# Patient Record
Sex: FEMALE | Race: OTHER | URBAN - METROPOLITAN AREA
[De-identification: names, ages, dates, MRNs, and addresses within clinical notes are randomized per-mention and may not be internally consistent; named-entity substitution may affect disease eponyms.]

---

## 2022-02-01 ENCOUNTER — NEW PATIENT (OUTPATIENT)
Dept: URBAN - METROPOLITAN AREA CLINIC 49 | Facility: CLINIC | Age: 72
End: 2022-02-01

## 2022-02-01 DIAGNOSIS — H18.413: ICD-10-CM

## 2022-02-01 DIAGNOSIS — H25.13: ICD-10-CM

## 2022-02-01 PROCEDURE — 92004 COMPRE OPH EXAM NEW PT 1/>: CPT

## 2022-02-01 PROCEDURE — 92015 DETERMINE REFRACTIVE STATE: CPT

## 2022-02-01 ASSESSMENT — VISUAL ACUITY
OD_CC: 20/40-1
OS_CC: 20/20-2
OD_GLARE: 20/60
OU_CC: J1+
OS_GLARE: 20/400
OS_GLARE: >20/400
OD_PH: 20/25
OD_GLARE: 20/50

## 2022-02-01 ASSESSMENT — TONOMETRY
OS_IOP_MMHG: 16
OD_IOP_MMHG: 16

## 2022-02-01 NOTE — PATIENT DISCUSSION
On exam today right is worse than left. Patient to continue with MRX with prism until next visit when re evaluated for cataract surgery.

## 2023-06-20 ENCOUNTER — ESTABLISHED PATIENT (OUTPATIENT)
Dept: URBAN - METROPOLITAN AREA CLINIC 49 | Facility: CLINIC | Age: 73
End: 2023-06-20

## 2023-06-20 DIAGNOSIS — H43.392: ICD-10-CM

## 2023-06-20 DIAGNOSIS — H04.123: ICD-10-CM

## 2023-06-20 DIAGNOSIS — H35.363: ICD-10-CM

## 2023-06-20 PROCEDURE — 92134 CPTRZ OPH DX IMG PST SGM RTA: CPT

## 2023-06-20 PROCEDURE — 92014 COMPRE OPH EXAM EST PT 1/>: CPT

## 2023-06-20 ASSESSMENT — VISUAL ACUITY
OD_CC: 20/40-2
OD_PH: 20/30
OS_PH: 20/25
OS_CC: 20/30-2
OS_GLARE: 20/70
OS_GLARE: 20/50

## 2023-06-20 ASSESSMENT — TONOMETRY
OS_IOP_MMHG: 18
OD_IOP_MMHG: 19

## 2024-02-06 ENCOUNTER — CONSULTATION/EVALUATION (OUTPATIENT)
Dept: URBAN - METROPOLITAN AREA CLINIC 49 | Facility: LOCATION | Age: 74
End: 2024-02-06

## 2024-02-06 DIAGNOSIS — H35.362: ICD-10-CM

## 2024-02-06 DIAGNOSIS — H25.13: ICD-10-CM

## 2024-02-06 PROCEDURE — 92015 DETERMINE REFRACTIVE STATE: CPT

## 2024-02-06 PROCEDURE — 92134 CPTRZ OPH DX IMG PST SGM RTA: CPT

## 2024-02-06 PROCEDURE — 99213 OFFICE O/P EST LOW 20 MIN: CPT

## 2024-02-06 RX ORDER — LIFITEGRAST 50 MG/ML
1 SOLUTION/ DROPS OPHTHALMIC TWICE A DAY
Start: 2024-02-06

## 2024-02-06 ASSESSMENT — KERATOMETRY
OD_K1POWER_DIOPTERS: 42.25
OD_K2POWER_DIOPTERS: 42.50
OD_AXISANGLE2_DEGREES: 48
OS_K2POWER_DIOPTERS: 43.75
OS_AXISANGLE2_DEGREES: 92
OS_AXISANGLE_DEGREES: 002
OD_AXISANGLE_DEGREES: 138
OS_K1POWER_DIOPTERS: 42.25

## 2024-02-06 ASSESSMENT — VISUAL ACUITY
OD_GLARE: 20/70
OD_CC: 20/70-1
OS_GLARE: 20/40
OS_PH: 20/40+2
OS_CC: 20/70-1
OD_PH: 20/40+1
OS_GLARE: 20/60
OD_GLARE: 20/70

## 2024-02-06 ASSESSMENT — TONOMETRY
OD_IOP_MMHG: 17
OS_IOP_MMHG: 16

## 2024-02-21 ENCOUNTER — PRE-OP/H&P (OUTPATIENT)
Dept: URBAN - METROPOLITAN AREA CLINIC 53 | Facility: CLINIC | Age: 74
End: 2024-02-21

## 2024-02-21 DIAGNOSIS — H25.13: ICD-10-CM

## 2024-02-21 PROCEDURE — 92136 OPHTHALMIC BIOMETRY: CPT

## 2024-02-21 PROCEDURE — PREOP PRE OP VISIT

## 2024-02-21 PROCEDURE — 92025CV CORNEAL TOPOGRAPHY, CV

## 2024-02-21 ASSESSMENT — KERATOMETRY
OS_K2POWER_DIOPTERS: 41.25
OS_AXISANGLE2_DEGREES: 168
OD_K1POWER_DIOPTERS: 42.75
OD_K2POWER_DIOPTERS: 41.87
OD_AXISANGLE2_DEGREES: 176
OS_K1POWER_DIOPTERS: 43.25
OS_AXISANGLE_DEGREES: 078
OD_AXISANGLE_DEGREES: 086

## 2024-02-21 ASSESSMENT — TONOMETRY
OD_IOP_MMHG: 16
OS_IOP_MMHG: 17
OS_IOP_MMHG: 24
OD_IOP_MMHG: 22

## 2024-02-21 ASSESSMENT — VISUAL ACUITY
OS_CC: 20/40-1
OD_CC: 20/50
OD_PH: 20/25-2
OS_PH: 20/25-1
OU_CC: 20/40

## 2024-02-21 ASSESSMENT — PACHYMETRY
OD_CT_UM: 629
OS_CT_UM: 656

## 2024-02-29 ENCOUNTER — POST-OP (OUTPATIENT)
Dept: URBAN - METROPOLITAN AREA CLINIC 48 | Facility: LOCATION | Age: 74
End: 2024-02-29

## 2024-02-29 ENCOUNTER — SURGERY/PROCEDURE (OUTPATIENT)
Dept: URBAN - METROPOLITAN AREA SURGERY 16 | Facility: SURGERY | Age: 74
End: 2024-02-29

## 2024-02-29 DIAGNOSIS — Z96.1: ICD-10-CM

## 2024-02-29 DIAGNOSIS — Z98.41: ICD-10-CM

## 2024-02-29 DIAGNOSIS — H25.11: ICD-10-CM

## 2024-02-29 PROCEDURE — 99199PCV CUSTOM VISION

## 2024-02-29 PROCEDURE — 66984CV REMOVE CATARACT, INSERT LENS, CUSTOM VISION

## 2024-02-29 ASSESSMENT — KERATOMETRY
OD_AXISANGLE2_DEGREES: 176
OD_AXISANGLE_DEGREES: 086
OD_AXISANGLE_DEGREES: 086
OD_K2POWER_DIOPTERS: 41.87
OS_K2POWER_DIOPTERS: 41.25
OD_AXISANGLE2_DEGREES: 176
OS_AXISANGLE_DEGREES: 078
OS_AXISANGLE2_DEGREES: 168
OS_AXISANGLE_DEGREES: 078
OD_K2POWER_DIOPTERS: 41.87
OD_K1POWER_DIOPTERS: 42.75
OS_K1POWER_DIOPTERS: 43.25
OS_K1POWER_DIOPTERS: 43.25
OS_AXISANGLE2_DEGREES: 168
OD_K1POWER_DIOPTERS: 42.75
OS_K2POWER_DIOPTERS: 41.25

## 2024-02-29 ASSESSMENT — VISUAL ACUITY: OD_SC: 20/40

## 2024-02-29 ASSESSMENT — TONOMETRY: OD_IOP_MMHG: 21

## 2024-03-06 ENCOUNTER — POST OP/EVAL OF SECOND EYE (OUTPATIENT)
Dept: URBAN - METROPOLITAN AREA CLINIC 53 | Facility: CLINIC | Age: 74
End: 2024-03-06

## 2024-03-06 DIAGNOSIS — H25.12: ICD-10-CM

## 2024-03-06 DIAGNOSIS — Z98.41: ICD-10-CM

## 2024-03-06 DIAGNOSIS — Z96.1: ICD-10-CM

## 2024-03-06 PROCEDURE — 92136NC IOLMASTER - NO CHARGE

## 2024-03-06 PROCEDURE — 99213 OFFICE O/P EST LOW 20 MIN: CPT

## 2024-03-06 ASSESSMENT — VISUAL ACUITY
OS_PH: 20/25
OS_GLARE: 20/25-1
OD_SC: 20/20-1
OS_CC: 20/30-1
OD_SC: J10@17"
OS_GLARE: 20/25

## 2024-03-06 ASSESSMENT — KERATOMETRY
OS_K1POWER_DIOPTERS: 42.25
OD_AXISANGLE2_DEGREES: 70
OS_AXISANGLE_DEGREES: 177
OS_K2POWER_DIOPTERS: 43.75
OD_AXISANGLE_DEGREES: 160
OD_K2POWER_DIOPTERS: 43.00
OD_K1POWER_DIOPTERS: 42.25
OS_AXISANGLE2_DEGREES: 87

## 2024-03-06 ASSESSMENT — TONOMETRY
OS_IOP_MMHG: 16
OS_IOP_MMHG: 09
OD_IOP_MMHG: 09
OD_IOP_MMHG: 15

## 2024-03-13 ASSESSMENT — KERATOMETRY
OS_K1POWER_DIOPTERS: 42.25
OS_K2POWER_DIOPTERS: 43.75
OS_AXISANGLE2_DEGREES: 87
OD_AXISANGLE2_DEGREES: 70
OD_K2POWER_DIOPTERS: 43.00
OD_K1POWER_DIOPTERS: 42.25
OD_AXISANGLE_DEGREES: 160
OS_AXISANGLE_DEGREES: 177

## 2024-03-14 ENCOUNTER — SURGERY/PROCEDURE (OUTPATIENT)
Dept: URBAN - METROPOLITAN AREA SURGERY 16 | Facility: SURGERY | Age: 74
End: 2024-03-14

## 2024-03-14 DIAGNOSIS — H25.12: ICD-10-CM

## 2024-03-14 PROCEDURE — 66984CV REMOVE CATARACT, INSERT LENS, CUSTOM VISION

## 2024-03-14 PROCEDURE — 99199PCV CUSTOM VISION

## 2024-03-15 ENCOUNTER — POST-OP (OUTPATIENT)
Dept: URBAN - METROPOLITAN AREA CLINIC 53 | Facility: CLINIC | Age: 74
End: 2024-03-15

## 2024-03-15 VITALS — HEIGHT: 55 IN | HEART RATE: 81 BPM | DIASTOLIC BLOOD PRESSURE: 92 MMHG | SYSTOLIC BLOOD PRESSURE: 136 MMHG

## 2024-03-15 DIAGNOSIS — Z98.42: ICD-10-CM

## 2024-03-15 DIAGNOSIS — Z96.1: ICD-10-CM

## 2024-03-15 ASSESSMENT — TONOMETRY
OS_IOP_MMHG: 14
OS_IOP_MMHG: 21

## 2024-03-15 ASSESSMENT — VISUAL ACUITY
OS_SC: 20/25-2
OS_PH: 20/20-1
OD_SC: 20/20-1

## 2024-03-15 ASSESSMENT — KERATOMETRY
OD_K2POWER_DIOPTERS: 43.00
OS_AXISANGLE2_DEGREES: 87
OS_AXISANGLE_DEGREES: 177
OS_K2POWER_DIOPTERS: 43.75
OD_AXISANGLE_DEGREES: 160
OS_K1POWER_DIOPTERS: 42.25
OD_AXISANGLE2_DEGREES: 70
OD_K1POWER_DIOPTERS: 42.25

## 2024-03-21 ENCOUNTER — POST-OP (OUTPATIENT)
Dept: URBAN - METROPOLITAN AREA CLINIC 48 | Facility: LOCATION | Age: 74
End: 2024-03-21

## 2024-03-21 DIAGNOSIS — Z98.42: ICD-10-CM

## 2024-03-21 DIAGNOSIS — Z96.1: ICD-10-CM

## 2024-03-21 ASSESSMENT — TONOMETRY
OS_IOP_MMHG: 14
OD_IOP_MMHG: 08
OD_IOP_MMHG: 14
OS_IOP_MMHG: 07

## 2024-03-21 ASSESSMENT — KERATOMETRY
OD_AXISANGLE_DEGREES: 150
OD_K2POWER_DIOPTERS: 43.00
OS_K2POWER_DIOPTERS: 43.75
OS_K1POWER_DIOPTERS: 42.00
OD_AXISANGLE2_DEGREES: 60
OD_K1POWER_DIOPTERS: 42.25
OS_AXISANGLE_DEGREES: 180
OS_AXISANGLE2_DEGREES: 90

## 2024-03-21 ASSESSMENT — VISUAL ACUITY
OS_SC: J10 @ 15IN
OS_SC: 20/25-2
OD_SC: 20/20-1

## 2024-04-10 ENCOUNTER — POST-OP (OUTPATIENT)
Dept: URBAN - METROPOLITAN AREA CLINIC 53 | Facility: CLINIC | Age: 74
End: 2024-04-10

## 2024-04-10 DIAGNOSIS — Z98.41: ICD-10-CM

## 2024-04-10 DIAGNOSIS — Z98.42: ICD-10-CM

## 2024-04-10 DIAGNOSIS — H43.813: ICD-10-CM

## 2024-04-10 DIAGNOSIS — Z96.1: ICD-10-CM

## 2024-04-10 PROCEDURE — 99024 POSTOP FOLLOW-UP VISIT: CPT

## 2024-04-10 PROCEDURE — 92134 CPTRZ OPH DX IMG PST SGM RTA: CPT

## 2024-04-10 PROCEDURE — 92015GRNC REFRACTION GLASSES RECHECK NO CHARGE

## 2024-04-10 ASSESSMENT — TONOMETRY
OS_IOP_MMHG: 08
OD_IOP_MMHG: 08
OD_IOP_MMHG: 14
OS_IOP_MMHG: 15

## 2024-04-10 ASSESSMENT — VISUAL ACUITY
OD_SC: 20/20
OS_SC: 20/25
OD_SC: J10@14"

## 2024-04-10 ASSESSMENT — KERATOMETRY
OS_K2POWER_DIOPTERS: 43.75
OD_K1POWER_DIOPTERS: 41.75
OD_K2POWER_DIOPTERS: 42.25
OS_AXISANGLE2_DEGREES: 90
OS_K1POWER_DIOPTERS: 42.00
OD_AXISANGLE2_DEGREES: 25
OS_AXISANGLE_DEGREES: 180
OD_AXISANGLE_DEGREES: 115

## 2024-05-23 ENCOUNTER — CONTACT LENSES/GLASSES VISIT (OUTPATIENT)
Dept: URBAN - METROPOLITAN AREA CLINIC 48 | Facility: LOCATION | Age: 74
End: 2024-05-23

## 2024-05-23 DIAGNOSIS — H53.2: ICD-10-CM

## 2024-05-23 DIAGNOSIS — Z97.3: ICD-10-CM

## 2024-05-23 PROCEDURE — 92310-1E ESTABLISHED CL PATIENT SPHERICAL SINGLE VISION SOFT LENS EVALUATION

## 2024-05-23 ASSESSMENT — VISUAL ACUITY
OS_SC: 20/20-1
OD_SC: 20/20
OU_SC: J5
OU_SC: 20/20

## 2024-05-23 ASSESSMENT — KERATOMETRY
OD_AXISANGLE2_DEGREES: 25
OS_K2POWER_DIOPTERS: 43.75
OS_K1POWER_DIOPTERS: 42.00
OD_K2POWER_DIOPTERS: 42.25
OD_K1POWER_DIOPTERS: 41.75
OS_AXISANGLE2_DEGREES: 90
OD_AXISANGLE_DEGREES: 115
OS_AXISANGLE_DEGREES: 180

## 2024-06-11 ENCOUNTER — CONTACT LENSES/GLASSES VISIT (OUTPATIENT)
Dept: URBAN - METROPOLITAN AREA CLINIC 48 | Facility: LOCATION | Age: 74
End: 2024-06-11

## 2024-06-11 DIAGNOSIS — Z97.3: ICD-10-CM

## 2024-06-11 PROCEDURE — 92310N CONTACT LENS F/U, 3 OR LESS N/C

## 2024-06-11 ASSESSMENT — KERATOMETRY
OS_K1POWER_DIOPTERS: 42.00
OD_AXISANGLE2_DEGREES: 25
OS_AXISANGLE_DEGREES: 180
OD_AXISANGLE_DEGREES: 115
OS_AXISANGLE2_DEGREES: 90
OD_K1POWER_DIOPTERS: 41.75
OS_K2POWER_DIOPTERS: 43.75
OD_K2POWER_DIOPTERS: 42.25

## 2024-07-17 ENCOUNTER — CONTACT LENSES/GLASSES VISIT (OUTPATIENT)
Dept: URBAN - METROPOLITAN AREA CLINIC 48 | Facility: LOCATION | Age: 74
End: 2024-07-17

## 2024-07-17 DIAGNOSIS — H53.2: ICD-10-CM

## 2024-07-17 PROCEDURE — 92060 SENSORIMOTOR EXAMINATION: CPT

## 2024-07-17 ASSESSMENT — KERATOMETRY
OS_AXISANGLE2_DEGREES: 90
OD_K1POWER_DIOPTERS: 41.75
OS_AXISANGLE_DEGREES: 180
OD_AXISANGLE_DEGREES: 115
OS_K1POWER_DIOPTERS: 42.00
OD_AXISANGLE2_DEGREES: 25
OD_K2POWER_DIOPTERS: 42.25
OS_K2POWER_DIOPTERS: 43.75

## 2024-07-17 ASSESSMENT — VISUAL ACUITY
OU_CC: J1+/-2
OD_SC: 20/20
OS_SC: 20/20-2
OU_SC: 20/20

## 2024-08-16 ENCOUNTER — CONTACT LENSES/GLASSES VISIT (OUTPATIENT)
Dept: URBAN - METROPOLITAN AREA CLINIC 53 | Facility: CLINIC | Age: 74
End: 2024-08-16

## 2024-08-16 DIAGNOSIS — H53.2: ICD-10-CM

## 2024-08-16 PROCEDURE — 92015GRNC REFRACTION GLASSES RECHECK NO CHARGE: Mod: NC

## 2024-08-16 ASSESSMENT — VISUAL ACUITY
OU_SC: 20/20
OS_SC: 20/20-1
OD_SC: 20/20

## 2024-08-16 ASSESSMENT — KERATOMETRY
OS_AXISANGLE2_DEGREES: 90
OS_K1POWER_DIOPTERS: 42.00
OD_AXISANGLE_DEGREES: 115
OD_K1POWER_DIOPTERS: 41.75
OS_K2POWER_DIOPTERS: 43.75
OS_AXISANGLE_DEGREES: 180
OD_AXISANGLE2_DEGREES: 25
OD_K2POWER_DIOPTERS: 42.25

## 2025-02-12 ENCOUNTER — COMPREHENSIVE EXAM (OUTPATIENT)
Age: 75
End: 2025-02-12

## 2025-02-12 DIAGNOSIS — H04.123: ICD-10-CM

## 2025-02-12 DIAGNOSIS — H26.493: ICD-10-CM

## 2025-02-12 DIAGNOSIS — H35.362: ICD-10-CM

## 2025-02-12 DIAGNOSIS — H53.2: ICD-10-CM

## 2025-02-12 PROCEDURE — 99214 OFFICE O/P EST MOD 30 MIN: CPT | Mod: 57

## 2025-02-12 PROCEDURE — 66821 AFTER CATARACT LASER SURGERY: CPT

## 2025-02-12 PROCEDURE — 92134 CPTRZ OPH DX IMG PST SGM RTA: CPT

## 2025-02-12 RX ORDER — PREDNISOLONE ACETATE 10 MG/ML: 1 SUSPENSION/ DROPS OPHTHALMIC TWICE A DAY

## 2025-03-06 ENCOUNTER — CONTACT LENSES/GLASSES VISIT (OUTPATIENT)
Age: 75
End: 2025-03-06

## 2025-03-06 DIAGNOSIS — H53.2: ICD-10-CM

## 2025-03-06 DIAGNOSIS — H52.4: ICD-10-CM

## 2025-03-06 PROCEDURE — 92015 DETERMINE REFRACTIVE STATE: CPT

## 2025-03-06 PROCEDURE — 92060 SENSORIMOTOR EXAMINATION: CPT

## 2025-03-12 ENCOUNTER — POST-OP (OUTPATIENT)
Age: 75
End: 2025-03-12

## 2025-03-12 DIAGNOSIS — Z98.890: ICD-10-CM

## 2025-03-12 DIAGNOSIS — H26.492: ICD-10-CM

## 2025-03-12 DIAGNOSIS — H04.123: ICD-10-CM

## 2025-03-12 DIAGNOSIS — H35.362: ICD-10-CM

## 2025-03-12 PROCEDURE — 99024 POSTOP FOLLOW-UP VISIT: CPT
